# Patient Record
Sex: FEMALE | Race: WHITE | NOT HISPANIC OR LATINO | URBAN - METROPOLITAN AREA
[De-identification: names, ages, dates, MRNs, and addresses within clinical notes are randomized per-mention and may not be internally consistent; named-entity substitution may affect disease eponyms.]

---

## 2019-06-02 ENCOUNTER — EMERGENCY (EMERGENCY)
Facility: HOSPITAL | Age: 29
LOS: 1 days | End: 2019-06-02
Admitting: EMERGENCY MEDICINE
Payer: COMMERCIAL

## 2019-06-02 PROCEDURE — 76830 TRANSVAGINAL US NON-OB: CPT | Mod: 26

## 2019-06-02 PROCEDURE — 76856 US EXAM PELVIC COMPLETE: CPT | Mod: 26

## 2019-06-02 PROCEDURE — 76937 US GUIDE VASCULAR ACCESS: CPT | Mod: 26

## 2019-06-02 PROCEDURE — 99284 EMERGENCY DEPT VISIT MOD MDM: CPT | Mod: 25

## 2019-06-30 ENCOUNTER — EMERGENCY (EMERGENCY)
Facility: HOSPITAL | Age: 29
LOS: 1 days | End: 2019-06-30
Admitting: EMERGENCY MEDICINE
Payer: COMMERCIAL

## 2019-06-30 PROCEDURE — 99283 EMERGENCY DEPT VISIT LOW MDM: CPT

## 2019-09-06 ENCOUNTER — APPOINTMENT (OUTPATIENT)
Dept: CARDIOLOGY | Facility: CLINIC | Age: 29
End: 2019-09-06
Payer: COMMERCIAL

## 2019-09-06 ENCOUNTER — NON-APPOINTMENT (OUTPATIENT)
Age: 29
End: 2019-09-06

## 2019-09-06 VITALS
HEIGHT: 63 IN | HEART RATE: 96 BPM | DIASTOLIC BLOOD PRESSURE: 60 MMHG | SYSTOLIC BLOOD PRESSURE: 110 MMHG | BODY MASS INDEX: 27.29 KG/M2 | WEIGHT: 154 LBS

## 2019-09-06 VITALS
RESPIRATION RATE: 10 BRPM | DIASTOLIC BLOOD PRESSURE: 60 MMHG | WEIGHT: 154 LBS | SYSTOLIC BLOOD PRESSURE: 110 MMHG | BODY MASS INDEX: 27.29 KG/M2 | HEART RATE: 96 BPM | HEIGHT: 63 IN

## 2019-09-06 DIAGNOSIS — Z72.3 LACK OF PHYSICAL EXERCISE: ICD-10-CM

## 2019-09-06 DIAGNOSIS — M54.40 LUMBAGO WITH SCIATICA, UNSPECIFIED SIDE: ICD-10-CM

## 2019-09-06 DIAGNOSIS — Z87.42 PERSONAL HISTORY OF OTHER DISEASES OF THE FEMALE GENITAL TRACT: ICD-10-CM

## 2019-09-06 DIAGNOSIS — Z78.9 OTHER SPECIFIED HEALTH STATUS: ICD-10-CM

## 2019-09-06 DIAGNOSIS — F17.210 NICOTINE DEPENDENCE, CIGARETTES, UNCOMPLICATED: ICD-10-CM

## 2019-09-06 DIAGNOSIS — Z82.49 FAMILY HISTORY OF ISCHEMIC HEART DISEASE AND OTHER DISEASES OF THE CIRCULATORY SYSTEM: ICD-10-CM

## 2019-09-06 DIAGNOSIS — R10.2 PELVIC AND PERINEAL PAIN: ICD-10-CM

## 2019-09-06 DIAGNOSIS — M25.562 PAIN IN LEFT KNEE: ICD-10-CM

## 2019-09-06 DIAGNOSIS — M79.605 PAIN IN LEFT LEG: ICD-10-CM

## 2019-09-06 DIAGNOSIS — Z87.39 PERSONAL HISTORY OF OTHER DISEASES OF THE MUSCULOSKELETAL SYSTEM AND CONNECTIVE TISSUE: ICD-10-CM

## 2019-09-06 PROCEDURE — 93000 ELECTROCARDIOGRAM COMPLETE: CPT

## 2019-09-06 PROCEDURE — 99204 OFFICE O/P NEW MOD 45 MIN: CPT | Mod: 25

## 2019-09-06 NOTE — HISTORY OF PRESENT ILLNESS
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 At first it was thought to be secondary to eye issues. Patient was wearing contacts at that time but switched over to glasses since. Episodes still occur. She describes them at times the room spinning and at times feeling lightheaded like she will pass out. Never had syncope. She gets multiple episodes a day and occurs 4-5 days a week. No triggers that she can identify.  Not getting any better or worse since June. When she does get them she gets palpitations described as racing heart after, however she contributes that to feeling stressed and anxious about her symptoms. It doesn't occur prior to her lightheadedness. She has also noticed swelling in her hands and feet as well since June. Worked up for Lyme which was negative.

## 2019-09-06 NOTE — REVIEW OF SYSTEMS
[Eyeglasses] : currently wearing eyeglasses [Palpitations] : palpitations [see HPI] : see HPI [Anxiety] : anxiety [Negative] : Heme/Lymph [Shortness Of Breath] : no shortness of breath [Chest Pain] : no chest pain [Dyspnea on exertion] : not dyspnea during exertion [Lower Ext Edema] : no extremity edema

## 2019-09-06 NOTE — DISCUSSION/SUMMARY
[FreeTextEntry1] : 1. Lightheadedness/Palpitations: recommend 48 hour Holter Monitor to rule out any arrhythmias. Will order echocardiogram to evaluate for structural heart disease. \par \par Follow up after testing for results.

## 2019-09-06 NOTE — PHYSICAL EXAM
[General Appearance - Well Developed] : well developed [Normal Appearance] : normal appearance [Well Groomed] : well groomed [General Appearance - Well Nourished] : well nourished [General Appearance - In No Acute Distress] : no acute distress [Normal Conjunctiva] : the conjunctiva exhibited no abnormalities [Eyelids - No Xanthelasma] : the eyelids demonstrated no xanthelasmas [No Oral Pallor] : no oral pallor [No Oral Cyanosis] : no oral cyanosis [Heart Rate And Rhythm] : heart rate and rhythm were normal [FreeTextEntry1] : No carotid artery bruits auscultated bilaterally [Murmurs] : no murmurs present [Heart Sounds] : normal S1 and S2 [Edema] : no peripheral edema present [Respiration, Rhythm And Depth] : normal respiratory rhythm and effort [Auscultation Breath Sounds / Voice Sounds] : lungs were clear to auscultation bilaterally [Exaggerated Use Of Accessory Muscles For Inspiration] : no accessory muscle use [Gait - Sufficient For Exercise Testing] : the gait was sufficient for exercise testing [Abnormal Walk] : normal gait [Nail Clubbing] : no clubbing of the fingernails [Cyanosis, Localized] : no localized cyanosis [Skin Turgor] : normal skin turgor [] : no rash [Impaired Insight] : insight and judgment were intact [Affect] : the affect was normal [Memory Recent] : recent memory was not impaired

## 2019-09-12 ENCOUNTER — APPOINTMENT (OUTPATIENT)
Dept: CARDIOLOGY | Facility: CLINIC | Age: 29
End: 2019-09-12
Payer: COMMERCIAL

## 2019-09-12 PROCEDURE — 93306 TTE W/DOPPLER COMPLETE: CPT

## 2019-09-12 PROCEDURE — 0296T: CPT

## 2019-09-27 ENCOUNTER — APPOINTMENT (OUTPATIENT)
Dept: CARDIOLOGY | Facility: CLINIC | Age: 29
End: 2019-09-27
Payer: COMMERCIAL

## 2019-09-27 VITALS
BODY MASS INDEX: 26.58 KG/M2 | HEIGHT: 63 IN | DIASTOLIC BLOOD PRESSURE: 72 MMHG | HEART RATE: 80 BPM | OXYGEN SATURATION: 97 % | WEIGHT: 150 LBS | SYSTOLIC BLOOD PRESSURE: 112 MMHG

## 2019-09-27 PROCEDURE — 0298T: CPT

## 2019-09-27 PROCEDURE — 99214 OFFICE O/P EST MOD 30 MIN: CPT

## 2019-09-27 NOTE — PHYSICAL EXAM
[General Appearance - Well Developed] : well developed [Normal Appearance] : normal appearance [Well Groomed] : well groomed [General Appearance - Well Nourished] : well nourished [General Appearance - In No Acute Distress] : no acute distress [Normal Conjunctiva] : the conjunctiva exhibited no abnormalities [Eyelids - No Xanthelasma] : the eyelids demonstrated no xanthelasmas [No Oral Pallor] : no oral pallor [No Oral Cyanosis] : no oral cyanosis [FreeTextEntry1] : No carotid artery bruits auscultated bilaterally [Respiration, Rhythm And Depth] : normal respiratory rhythm and effort [Exaggerated Use Of Accessory Muscles For Inspiration] : no accessory muscle use [Auscultation Breath Sounds / Voice Sounds] : lungs were clear to auscultation bilaterally [Heart Rate And Rhythm] : heart rate and rhythm were normal [Heart Sounds] : normal S1 and S2 [Murmurs] : no murmurs present [Edema] : no peripheral edema present [Abnormal Walk] : normal gait [Gait - Sufficient For Exercise Testing] : the gait was sufficient for exercise testing [Nail Clubbing] : no clubbing of the fingernails [Cyanosis, Localized] : no localized cyanosis [Skin Turgor] : normal skin turgor [] : no rash [Impaired Insight] : insight and judgment were intact [Affect] : the affect was normal [Memory Recent] : recent memory was not impaired

## 2019-09-27 NOTE — REVIEW OF SYSTEMS
[Eyeglasses] : currently wearing eyeglasses [Shortness Of Breath] : no shortness of breath [Dyspnea on exertion] : not dyspnea during exertion [Chest Pain] : no chest pain [Lower Ext Edema] : no extremity edema [Palpitations] : palpitations [see HPI] : see HPI [Anxiety] : anxiety [Negative] : Heme/Lymph

## 2019-09-27 NOTE — HISTORY OF PRESENT ILLNESS
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 At first it was thought to be secondary to eye issues. Patient was wearing contacts at that time but switched over to glasses since. Episodes still occur. She describes them at times the room spinning and at times feeling lightheaded like she will pass out. Never had syncope. She gets multiple episodes a day and occurs 4-5 days a week. No triggers that she can identify.  Not getting any better or worse since June. When she does get them she gets palpitations described as racing heart after, however she contributes that to feeling stressed and anxious about her symptoms. It doesn't occur prior to her lightheadedness. She has also noticed swelling in her hands and feet as well since June. Worked up for Lyme which was negative. \par \par Current Health Status:\par Patient still with intermittent dizziness. Palpitations improved. No syncope. Patient going to speak with psychologist because of her anxiety.

## 2020-07-11 ENCOUNTER — EMERGENCY (EMERGENCY)
Facility: HOSPITAL | Age: 30
LOS: 1 days | End: 2020-07-11
Admitting: EMERGENCY MEDICINE
Payer: COMMERCIAL

## 2020-07-11 PROCEDURE — 99284 EMERGENCY DEPT VISIT MOD MDM: CPT

## 2020-10-22 ENCOUNTER — NON-APPOINTMENT (OUTPATIENT)
Age: 30
End: 2020-10-22

## 2020-10-22 ENCOUNTER — APPOINTMENT (OUTPATIENT)
Dept: CARDIOLOGY | Facility: CLINIC | Age: 30
End: 2020-10-22
Payer: COMMERCIAL

## 2020-10-22 VITALS
WEIGHT: 151 LBS | HEART RATE: 102 BPM | DIASTOLIC BLOOD PRESSURE: 62 MMHG | SYSTOLIC BLOOD PRESSURE: 110 MMHG | HEIGHT: 62 IN | OXYGEN SATURATION: 99 % | TEMPERATURE: 97.5 F | BODY MASS INDEX: 27.79 KG/M2

## 2020-10-22 PROCEDURE — 99214 OFFICE O/P EST MOD 30 MIN: CPT | Mod: 25

## 2020-10-22 PROCEDURE — 93000 ELECTROCARDIOGRAM COMPLETE: CPT

## 2020-10-22 PROCEDURE — 99072 ADDL SUPL MATRL&STAF TM PHE: CPT

## 2020-10-22 RX ORDER — OMEGA-3/DHA/EPA/FISH OIL 300-1000MG
400 CAPSULE ORAL DAILY
Refills: 0 | Status: ACTIVE | COMMUNITY

## 2020-10-22 NOTE — DISCUSSION/SUMMARY
[FreeTextEntry1] : 1. Lightheadedness/Palpitations: patient wore Zio Patch for 48 hours last year. Had NSR with intermittent episode of sinus tachycardia vs. atrial tachycardia. Opted to avoid medications at this point in time. TSH was within normal limits. Structurally normal heart on echocardiogram. Now that she is pregnant I am going to repeat labs and echocardiogram. Asked patient to maintain hydration, avoid situations where she has to strain, and avoid prolong standing. \par \par Follow up in 6 months. \par \par

## 2020-10-22 NOTE — PHYSICAL EXAM
[General Appearance - Well Developed] : well developed [Normal Appearance] : normal appearance [Well Groomed] : well groomed [General Appearance - Well Nourished] : well nourished [General Appearance - In No Acute Distress] : no acute distress [Normal Conjunctiva] : the conjunctiva exhibited no abnormalities [Eyelids - No Xanthelasma] : the eyelids demonstrated no xanthelasmas [No Oral Pallor] : no oral pallor [No Oral Cyanosis] : no oral cyanosis [Respiration, Rhythm And Depth] : normal respiratory rhythm and effort [Exaggerated Use Of Accessory Muscles For Inspiration] : no accessory muscle use [Auscultation Breath Sounds / Voice Sounds] : lungs were clear to auscultation bilaterally [Heart Rate And Rhythm] : heart rate and rhythm were normal [Heart Sounds] : normal S1 and S2 [Murmurs] : no murmurs present [Edema] : no peripheral edema present [Abnormal Walk] : normal gait [Gait - Sufficient For Exercise Testing] : the gait was sufficient for exercise testing [Nail Clubbing] : no clubbing of the fingernails [Cyanosis, Localized] : no localized cyanosis [Skin Turgor] : normal skin turgor [] : no rash [Impaired Insight] : insight and judgment were intact [Affect] : the affect was normal [Memory Recent] : recent memory was not impaired [FreeTextEntry1] : No carotid artery bruits auscultated bilaterally

## 2020-10-22 NOTE — HISTORY OF PRESENT ILLNESS
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 At first it was thought to be secondary to eye issues. Patient was wearing contacts at that time but switched over to glasses since. Episodes still occur. She describes them at times the room spinning and at times feeling lightheaded like she will pass out. Never had syncope. She gets multiple episodes a day and occurs 4-5 days a week. No triggers that she can identify.  Not getting any better or worse since June. When she does get them she gets palpitations described as racing heart after, however she contributes that to feeling stressed and anxious about her symptoms. It doesn't occur prior to her lightheadedness. She has also noticed swelling in her hands and feet as well since June. Worked up for Lyme which was negative. \par \par Current Health Status:\par Since seeing me last patient had to terminate a pregnancy in March 2020 because fetus was non-viable. She is currently 21 weeks pregnant. Over the weekend, while urinating, she developed lightheadedness, flushing and palpitations. She did not have syncope. It resolved spontaneously within 10 minutes. Patient states it occurred again while driving to work. Since she has been feeling tired throughout the day. There is no chest pain or SOB. Up until this point her pregnancy has been uneventful.

## 2020-10-22 NOTE — REVIEW OF SYSTEMS
[Eyeglasses] : currently wearing eyeglasses [Palpitations] : palpitations [see HPI] : see HPI [Anxiety] : anxiety [Negative] : Heme/Lymph [Shortness Of Breath] : no shortness of breath [Dyspnea on exertion] : not dyspnea during exertion [Chest Pain] : no chest pain [Lower Ext Edema] : no extremity edema

## 2020-11-03 ENCOUNTER — APPOINTMENT (OUTPATIENT)
Dept: CARDIOLOGY | Facility: CLINIC | Age: 30
End: 2020-11-03
Payer: COMMERCIAL

## 2020-11-03 PROCEDURE — 99072 ADDL SUPL MATRL&STAF TM PHE: CPT

## 2020-11-03 PROCEDURE — 93306 TTE W/DOPPLER COMPLETE: CPT

## 2021-04-13 ENCOUNTER — APPOINTMENT (OUTPATIENT)
Dept: CARDIOLOGY | Facility: CLINIC | Age: 31
End: 2021-04-13

## 2021-09-07 ENCOUNTER — EMERGENCY (EMERGENCY)
Facility: HOSPITAL | Age: 31
LOS: 1 days | End: 2021-09-07
Admitting: EMERGENCY MEDICINE
Payer: COMMERCIAL

## 2021-09-07 PROCEDURE — L9991: CPT

## 2021-09-27 ENCOUNTER — RESULT REVIEW (OUTPATIENT)
Age: 31
End: 2021-09-27

## 2021-09-27 ENCOUNTER — OUTPATIENT (OUTPATIENT)
Dept: OUTPATIENT SERVICES | Facility: HOSPITAL | Age: 31
LOS: 1 days | End: 2021-09-27
Payer: MEDICAID

## 2021-09-27 PROCEDURE — 74176 CT ABD & PELVIS W/O CONTRAST: CPT | Mod: 26

## 2021-11-15 ENCOUNTER — APPOINTMENT (OUTPATIENT)
Dept: DISASTER EMERGENCY | Facility: CLINIC | Age: 31
End: 2021-11-15

## 2021-11-16 LAB — SARS-COV-2 N GENE NPH QL NAA+PROBE: NOT DETECTED

## 2021-11-18 ENCOUNTER — OUTPATIENT (OUTPATIENT)
Dept: OUTPATIENT SERVICES | Facility: HOSPITAL | Age: 31
LOS: 1 days | End: 2021-11-18

## 2022-09-14 ENCOUNTER — NON-APPOINTMENT (OUTPATIENT)
Age: 32
End: 2022-09-14

## 2022-11-23 ENCOUNTER — APPOINTMENT (OUTPATIENT)
Dept: CARDIOLOGY | Facility: CLINIC | Age: 32
End: 2022-11-23

## 2022-11-30 ENCOUNTER — NON-APPOINTMENT (OUTPATIENT)
Age: 32
End: 2022-11-30

## 2022-11-30 ENCOUNTER — APPOINTMENT (OUTPATIENT)
Dept: CARDIOLOGY | Facility: CLINIC | Age: 32
End: 2022-11-30

## 2022-11-30 VITALS
BODY MASS INDEX: 27.79 KG/M2 | HEIGHT: 62 IN | DIASTOLIC BLOOD PRESSURE: 64 MMHG | WEIGHT: 151 LBS | HEART RATE: 88 BPM | SYSTOLIC BLOOD PRESSURE: 114 MMHG | OXYGEN SATURATION: 98 %

## 2022-11-30 PROCEDURE — 93242 EXT ECG>48HR<7D RECORDING: CPT | Mod: 59

## 2022-11-30 PROCEDURE — 99214 OFFICE O/P EST MOD 30 MIN: CPT | Mod: 25

## 2022-11-30 PROCEDURE — 93000 ELECTROCARDIOGRAM COMPLETE: CPT

## 2022-11-30 NOTE — CARDIOLOGY SUMMARY
[Normal] : normal [___] : [unfilled] [LVEF ___%] : LVEF [unfilled]% [de-identified] : November 30, 2022 normal sinus

## 2022-11-30 NOTE — ASSESSMENT
[FreeTextEntry1] : Reviewed on November 30, 2020.\par EKG as noted above\par Labs from October 17, 2022 sodium 139 potassium 4.2 creatinine 0.49.   triglycerides 78 HDL 51 total cholesterol 193.  TSH was 1.07 CBC was normal

## 2022-11-30 NOTE — DISCUSSION/SUMMARY
[FreeTextEntry1] : 32-year-old female with above medical history active medical problems as noted below\par 1.  Intermittent palpitations now associated near syncope.  Shortness of breath and chest discomfort.  With exertion.\par Recommendation includes\par Hydration\par Echocardiogram\par Exercise treadmill stress test\par Event monitor\par Based on above we can discuss further whether we should consider beta-blockers or not.\par Continue to avoid stimulants like caffeine and alcohol intake as much as possible.\par Increasing regular exercise\par Stress management\par #2 elevated LDL cholesterol.  Dietary restriction and follow-up.  No indication for statin therapy at present.\par \par Counseling regarding low saturated fat, salt and carbohydrate intake was reviewed. Active lifestyle and regular. Exercise along with weight management is advised.\par All the above were at length reviewed. Answered all the questions. Thank you very much for this kind referral. Please do not hesitate to give me a call for any question.\par Part of this transcription was done with voice recognition software and phonetically similar errors are common. I apologize for that. Please do not hesitate to call for any questions due to above.\par \par Sincerely,\par Latosha Friend MD,FACC,MARQUIS\par \par

## 2022-11-30 NOTE — REASON FOR VISIT
[Symptom and Test Evaluation] : symptom and test evaluation [FreeTextEntry1] : 32-year-old white female is referred to me for consultation in presence of\par Intermittent palpitations.  Lasting for few seconds though recently felt very dizzy lightheaded almost felt like passing out.  She had her apple watch.  Which noted his heart rate at 140 to 150 bpm.\par She also has noticed that she is having increasing shortness of breath\par And intermittent substernal sharp chest pain when she overdoes activity.\par She had 2 pregnancies.  She had symptoms of palpitation intermittent dizziness and event monitor showing intermittent sinus tachycardia and rate related bundle branch block.\par At the time echocardiogram has shown preserved EF.\par \par She is referred to me again because of about new symptoms.\par She states she has no increased salt intake.  Good dietary restrictions.  Does not do regular exercise as she has 2 toddlers and has been very active.\par She denies any history of smoking.  No significant alcohol intake.

## 2022-11-30 NOTE — PHYSICAL EXAM
[Well Developed] : well developed [Well Nourished] : well nourished [No Acute Distress] : no acute distress [Normal Venous Pressure] : normal venous pressure [No Carotid Bruit] : no carotid bruit [Normal S1, S2] : normal S1, S2 [No Murmur] : no murmur [No Rub] : no rub [No Gallop] : no gallop [Clear Lung Fields] : clear lung fields [Good Air Entry] : good air entry [No Respiratory Distress] : no respiratory distress  [Normal Gait] : normal gait [No Edema] : no edema [No Cyanosis] : no cyanosis [No Clubbing] : no clubbing [Normal Radial B/L] : normal radial B/L [Normal DP B/L] : normal DP B/L [Moves all extremities] : moves all extremities [Normal Speech] : normal speech [Alert and Oriented] : alert and oriented

## 2022-12-09 ENCOUNTER — APPOINTMENT (OUTPATIENT)
Dept: CARDIOLOGY | Facility: CLINIC | Age: 32
End: 2022-12-09

## 2022-12-09 PROCEDURE — 93244 EXT ECG>48HR<7D REV&INTERPJ: CPT

## 2022-12-16 ENCOUNTER — APPOINTMENT (OUTPATIENT)
Dept: CARDIOLOGY | Facility: CLINIC | Age: 32
End: 2022-12-16

## 2022-12-16 PROCEDURE — 93306 TTE W/DOPPLER COMPLETE: CPT

## 2022-12-20 ENCOUNTER — APPOINTMENT (OUTPATIENT)
Dept: CARDIOLOGY | Facility: CLINIC | Age: 32
End: 2022-12-20

## 2022-12-20 PROCEDURE — 93015 CV STRESS TEST SUPVJ I&R: CPT

## 2022-12-22 ENCOUNTER — APPOINTMENT (OUTPATIENT)
Dept: CARDIOLOGY | Facility: CLINIC | Age: 32
End: 2022-12-22

## 2022-12-22 PROCEDURE — 93351 STRESS TTE COMPLETE: CPT

## 2022-12-22 PROCEDURE — 93320 DOPPLER ECHO COMPLETE: CPT

## 2023-01-04 ENCOUNTER — APPOINTMENT (OUTPATIENT)
Dept: CARDIOLOGY | Facility: CLINIC | Age: 33
End: 2023-01-04

## 2023-01-05 ENCOUNTER — APPOINTMENT (OUTPATIENT)
Dept: CARDIOLOGY | Facility: CLINIC | Age: 33
End: 2023-01-05

## 2023-01-08 NOTE — PHYSICAL EXAM
[Well Developed] : well developed [Well Nourished] : well nourished [No Acute Distress] : no acute distress [Normal Venous Pressure] : normal venous pressure [No Carotid Bruit] : no carotid bruit [Normal S1, S2] : normal S1, S2 [No Rub] : no rub [No Murmur] : no murmur [No Gallop] : no gallop [Good Air Entry] : good air entry [Clear Lung Fields] : clear lung fields [No Respiratory Distress] : no respiratory distress  [Normal Gait] : normal gait [No Edema] : no edema [No Cyanosis] : no cyanosis [No Clubbing] : no clubbing [Normal Radial B/L] : normal radial B/L [Moves all extremities] : moves all extremities [Normal Speech] : normal speech [Alert and Oriented] : alert and oriented

## 2023-01-09 ENCOUNTER — APPOINTMENT (OUTPATIENT)
Dept: CARDIOLOGY | Facility: CLINIC | Age: 33
End: 2023-01-09
Payer: MEDICAID

## 2023-01-09 VITALS
BODY MASS INDEX: 26.22 KG/M2 | HEIGHT: 63 IN | WEIGHT: 148 LBS | SYSTOLIC BLOOD PRESSURE: 128 MMHG | HEART RATE: 98 BPM | OXYGEN SATURATION: 95 % | DIASTOLIC BLOOD PRESSURE: 76 MMHG

## 2023-01-09 PROCEDURE — 99214 OFFICE O/P EST MOD 30 MIN: CPT

## 2023-01-09 RX ORDER — CHLORHEXIDINE GLUCONATE 4 %
400 LIQUID (ML) TOPICAL DAILY
Refills: 0 | Status: ACTIVE | COMMUNITY
Start: 2023-01-09

## 2023-01-09 NOTE — REASON FOR VISIT
[Symptom and Test Evaluation] : symptom and test evaluation [FreeTextEntry1] : 32-year-old comes in for follow-up consultation to review her cardiovascular test and recent emergency room visit with EKGs.  She was seen recently when referred to me for consultation in presence of\par Intermittent palpitations.  Lasting for few seconds though recently felt very dizzy lightheaded almost felt like passing out.  She had her apple watch.  Which noted his heart rate at 140 to 150 bpm.\par She also has noticed that she is having increasing shortness of breath\par And intermittent substernal sharp chest pain when she overdoes activity.\par She had 2 pregnancies.  She had symptoms of palpitation intermittent dizziness and event monitor showing intermittent sinus tachycardia and rate related bundle branch block.\par At the time echocardiogram has shown preserved EF.\par \par She is referred to me again because of about new symptoms.\par She states she has no increased salt intake.  Good dietary restrictions.  Does not do regular exercise as she has 2 toddlers and has been very active.\par She denies any history of smoking.  No significant alcohol intake.

## 2023-01-09 NOTE — DISCUSSION/SUMMARY
[FreeTextEntry1] : 32-year-old female with above medical history active medical problems as noted below\par 1.  Intermittent palpitations now associated near syncope.  Shortness of breath and chest discomfort.  With exertion.  Intermittent left bundle branch block.  Nonischemic stress test.  Preserved EF.\par Recommendation includes\par Considering above symptoms, intermittent bundle branch block, dyspnea, intermittent chest pain and arrhythmias recommended her to have cardiac MRI to rule out any significant myocarditis/myopathy to account for her above clinical scenario.  Risk benefits alternatives of MRI with contrast reviewed\par Continue to avoid caffeine and alcohol intake\par Continue hydration\par Based on above we can discuss further whether we should consider beta-blockers or not.\par Increasing regular exercise\par Stress management\par #2 elevated LDL cholesterol.  Dietary restriction and follow-up.  No indication for statin therapy at present.\par \par Counseling regarding low saturated fat, salt and carbohydrate intake was reviewed. Active lifestyle and regular. Exercise along with weight management is advised.\par All the above were at length reviewed. Answered all the questions. Thank you very much for this kind referral. Please do not hesitate to give me a call for any question.\par Part of this transcription was done with voice recognition software and phonetically similar errors are common. I apologize for that. Please do not hesitate to call for any questions due to above.\par \par She will contact me after the MRI so that appropriate review can be done.  Otherwise if remains stable and negative MRI follow-up in 1 year or as needed\par \par Sincerely,\par Latosha Friend MD,FACC,MARQUIS\par \par

## 2023-01-09 NOTE — CARDIOLOGY SUMMARY
[Normal] : normal [___] : [unfilled] [LVEF ___%] : LVEF [unfilled]% [de-identified] : November 30, 2022 normal sinus\par EKG in the emergency room December 31, 2022 normal sinus rhythm left bundle branch block [de-identified] : Event monitor.  Showed intermittent left bundle branch block [de-identified] : Stress Echocardiogram December 22, 2022.  Nonischemic in presence of left bundle branch [de-identified] : December 2022.  LVEF 55 to 60% normal LV dimensions

## 2023-01-09 NOTE — ASSESSMENT
[FreeTextEntry1] : Reviewed on November 30, 2020.\par EKG as noted above\par Labs from October 17, 2022 sodium 139 potassium 4.2 creatinine 0.49.   triglycerides 78 HDL 51 total cholesterol 193.  TSH was 1.07 CBC was normal\par \par Reviewed on January 9, 2022.\par EKGs from the emergency room, echocardiogram, exercise echocardiogram were all reviewed.  Event monitor reviewed.

## 2023-01-09 NOTE — REVIEW OF SYSTEMS
[Negative] : Heme/Lymph [SOB] : no shortness of breath [Dyspnea on exertion] : dyspnea during exertion [Chest Discomfort] : chest discomfort [Palpitations] : palpitations [FreeTextEntry5] : As per HPI

## 2023-01-30 ENCOUNTER — NON-APPOINTMENT (OUTPATIENT)
Age: 33
End: 2023-01-30

## 2023-02-03 ENCOUNTER — NON-APPOINTMENT (OUTPATIENT)
Age: 33
End: 2023-02-03

## 2023-03-16 ENCOUNTER — APPOINTMENT (OUTPATIENT)
Dept: CARDIOLOGY | Facility: CLINIC | Age: 33
End: 2023-03-16
Payer: MEDICAID

## 2023-03-16 VITALS
TEMPERATURE: 98 F | SYSTOLIC BLOOD PRESSURE: 120 MMHG | BODY MASS INDEX: 26.58 KG/M2 | HEART RATE: 102 BPM | HEIGHT: 63 IN | DIASTOLIC BLOOD PRESSURE: 68 MMHG | WEIGHT: 150 LBS | OXYGEN SATURATION: 98 %

## 2023-03-16 DIAGNOSIS — Z01.818 ENCOUNTER FOR OTHER PREPROCEDURAL EXAMINATION: ICD-10-CM

## 2023-03-16 DIAGNOSIS — R06.02 SHORTNESS OF BREATH: ICD-10-CM

## 2023-03-16 PROCEDURE — 99214 OFFICE O/P EST MOD 30 MIN: CPT

## 2023-03-16 NOTE — CARDIOLOGY SUMMARY
[de-identified] : November 30, 2022 normal sinus\par EKG in the emergency room December 31, 2022 normal sinus rhythm left bundle branch block [de-identified] : Event monitor.  Showed intermittent left bundle branch block [de-identified] : Stress Echocardiogram December 22, 2022.  Nonischemic in presence of left bundle branch [de-identified] : December 2022.  LVEF 55 to 60% normal LV dimensions [Normal] : normal [___] : [unfilled] [LVEF ___%] : LVEF [unfilled]%

## 2023-03-16 NOTE — REVIEW OF SYSTEMS
[SOB] : no shortness of breath [Dyspnea on exertion] : dyspnea during exertion [Chest Discomfort] : chest discomfort [Palpitations] : palpitations [Negative] : Heme/Lymph [FreeTextEntry5] : As per HPI

## 2023-03-16 NOTE — DISCUSSION/SUMMARY
[FreeTextEntry1] : 32-year-old female with above medical history active medical problems as noted below\par \par 1.  Intermittent palpitations now associated near syncope.  Intermittent left bundle branch block.  Nonischemic stress test.  Preserved EF.  Consideration of POTS syndrome.\par \par Recommendation includes\par Cardiac MRI was discussed with the patient\par Continue to avoid caffeine and alcohol intake\par Continue hydration and liberal salt intake, lower extremity compressions, gradual change of postures and if possible, low-dose beta-blockers after her procedure ( DNC) is finished.\par Stress management\par \par #2 elevated LDL cholesterol.  Dietary restriction and follow-up.  No indication for statin therapy at present.\par \par Counseling regarding low saturated fat, salt and carbohydrate intake was reviewed. Active lifestyle and regular. \par \par **PREOP:\par Patient should be low risk for major cardiovascular events from anticipated DNC with anesthesia.\par Her cardiac MRI, EKG, stress echocardiogram were reviewed.  New medication was started today\par \par For the long-term: The patient has tendency for palpitations, tachycardia and POTS should be considered. Continue hydration and liberal salt intake, lower extremity compressions, gradual change of postures and if possible, low-dose beta-blockers -she wants to think regarding this and will contact us\par \par \par Thank you for this referral and allowing me to participate in the care of this patient.  If I can be of any further help or  if you have any questions, please do not hesitate to contact me\par \par \par Sincerely,\par \par Ellis Tam MD, FACC, MARQUIS\par \par

## 2023-03-16 NOTE — REASON FOR VISIT
[FreeTextEntry1] : 32-year-old female:\par \par History of intermittent palpitations.  Lasting for few seconds though recently felt very dizzy lightheaded almost felt like passing out.  She had her apple watch.  Which noted his heart rate at 140 to 150 bpm.\par She has noticed fatigue.  No longer has any chest pains.  Diagnosis of POTS was considered because of postural high heart rate with dizziness\par \par She had 2 pregnancies.  She had symptoms of palpitation intermittent dizziness and event monitor showing intermittent sinus tachycardia and rate related bundle branch block on ZIO recording November 2022. At the time echocardiogram has shown preserved EF.\par \par Does not do regular exercise as she has 2 toddlers and has been very active.  No exertional chest pain or shortness of breath.  No recent syncope.  He does have palpitations as noted above.\par She denies any history of smoking.  No significant alcohol intake.\par \par She had cardiac MRI in January 2023 which was normal with and without contrast.  She had stress echocardiogram in December 2022 which was negative for ischemia.\par \par She is scheduled to have DNC

## 2023-03-24 ENCOUNTER — NON-APPOINTMENT (OUTPATIENT)
Age: 33
End: 2023-03-24

## 2023-04-27 ENCOUNTER — NON-APPOINTMENT (OUTPATIENT)
Age: 33
End: 2023-04-27

## 2023-05-28 ENCOUNTER — NON-APPOINTMENT (OUTPATIENT)
Age: 33
End: 2023-05-28

## 2023-07-07 ENCOUNTER — APPOINTMENT (OUTPATIENT)
Dept: CARDIOLOGY | Facility: CLINIC | Age: 33
End: 2023-07-07
Payer: MEDICAID

## 2023-07-07 VITALS
HEART RATE: 87 BPM | HEIGHT: 63 IN | DIASTOLIC BLOOD PRESSURE: 68 MMHG | BODY MASS INDEX: 26.58 KG/M2 | WEIGHT: 150 LBS | OXYGEN SATURATION: 94 % | SYSTOLIC BLOOD PRESSURE: 100 MMHG

## 2023-07-07 DIAGNOSIS — R07.89 OTHER CHEST PAIN: ICD-10-CM

## 2023-07-07 PROCEDURE — 99214 OFFICE O/P EST MOD 30 MIN: CPT

## 2023-07-07 NOTE — HISTORY OF PRESENT ILLNESS
[FreeTextEntry1] : Presenting here today with headaches.  Her dad passed away few days ago.  She is under a lot of stress.  No chest pains or any significant palpitations.  No syncope.

## 2023-07-07 NOTE — CARDIOLOGY SUMMARY
[Normal] : normal [___] : [unfilled] [LVEF ___%] : LVEF [unfilled]% [de-identified] : November 30, 2022 normal sinus\par EKG in the emergency room December 31, 2022 normal sinus rhythm left bundle branch block [de-identified] : Event monitor.  Showed intermittent left bundle branch block [de-identified] : Stress Echocardiogram December 22, 2022.  Nonischemic in presence of left bundle branch [de-identified] : December 2022.  LVEF 55 to 60% normal LV dimensions

## 2023-07-07 NOTE — DISCUSSION/SUMMARY
[FreeTextEntry1] : 32-year-old female with above medical history active medical problems as noted below\par \par 1.  Intermittent palpitations now associated near syncope.  Intermittent left bundle branch block.  Nonischemic stress test.  Preserved EF.  Consideration of POTS syndrome.\par \par Recommendation includes\par Cardiac MRI was discussed with the patient\par Continue to avoid caffeine and alcohol intake\par Continue hydration and liberal salt intake, lower extremity compressions, gradual change of postures and if possible, low-dose beta-blockers after her procedure ( DNC) is finished.\par Stress management\par \par #2 elevated LDL cholesterol.  Dietary restriction and follow-up.  No indication for statin therapy at present.\par \par Counseling regarding low saturated fat, salt and carbohydrate intake was reviewed. Active lifestyle and regular. \par \par **PREOP:\par Patient should be low risk for major cardiovascular events from anticipated DNC with anesthesia.\par Her cardiac MRI, EKG, stress echocardiogram were reviewed.  New medication was started today\par \par For the long-term: The patient has tendency for palpitations, tachycardia and POTS should be considered. Continue hydration and liberal salt intake, lower extremity compressions, gradual change of postures and if possible, low-dose beta-blockers -she wants to think regarding this and will contact us\par \par \par We will order Zio patch for 1 week to reassess the heart rhythm.  Increased hydration discussed with the patient.  She has intermittent headaches.  I advised that she will follow-up with neurologist as soon as possible.  Cardiac follow-up after Zio patch.\par

## 2023-07-07 NOTE — REVIEW OF SYSTEMS
[Dyspnea on exertion] : dyspnea during exertion [Chest Discomfort] : chest discomfort [Palpitations] : palpitations [Negative] : Heme/Lymph [SOB] : no shortness of breath [FreeTextEntry5] : As per HPI

## 2023-07-11 ENCOUNTER — APPOINTMENT (OUTPATIENT)
Dept: CARDIOLOGY | Facility: CLINIC | Age: 33
End: 2023-07-11

## 2023-07-15 ENCOUNTER — OFFICE (OUTPATIENT)
Dept: URBAN - METROPOLITAN AREA CLINIC 38 | Facility: CLINIC | Age: 33
Setting detail: OPHTHALMOLOGY
End: 2023-07-15
Payer: COMMERCIAL

## 2023-07-15 DIAGNOSIS — H18.823: ICD-10-CM

## 2023-07-15 DIAGNOSIS — G43.109: ICD-10-CM

## 2023-07-15 DIAGNOSIS — H10.45: ICD-10-CM

## 2023-07-15 DIAGNOSIS — H43.391: ICD-10-CM

## 2023-07-15 PROCEDURE — 92014 COMPRE OPH EXAM EST PT 1/>: CPT | Performed by: OPTOMETRIST

## 2023-07-15 ASSESSMENT — TONOMETRY
OS_IOP_MMHG: 14
OD_IOP_MMHG: 15

## 2023-07-15 ASSESSMENT — REFRACTION_AUTOREFRACTION
OD_AXIS: 006
OS_AXIS: 161
OD_CYLINDER: -2.75
OD_SPHERE: -4.50
OS_SPHERE: -4.50
OS_CYLINDER: -3.25

## 2023-07-15 ASSESSMENT — REFRACTION_MANIFEST
OS_VA1: 20/20
OU_VA: 20/20
OD_SPHERE: -4.00
OD_VA1: 20/20
OS_SPHERE: -3.75
OD_CYLINDER: -2.00
OD_AXIS: 10
OS_AXIS: 155
OS_CYLINDER: -2.00

## 2023-07-15 ASSESSMENT — REFRACTION_CURRENTRX
OS_SPHERE: -3.75
OD_SPHERE: -4.00
OS_OVR_VA: 20/
OD_OVR_VA: 20/
OD_CYLINDER: -2.00
OD_VPRISM_DIRECTION: SV
OD_AXIS: 011
OS_AXIS: 154
OS_VPRISM_DIRECTION: SV
OS_CYLINDER: -2.00

## 2023-07-15 ASSESSMENT — SPHEQUIV_DERIVED
OD_SPHEQUIV: -5
OD_SPHEQUIV: -5.875
OS_SPHEQUIV: -4.75
OS_SPHEQUIV: -6.125

## 2023-07-15 ASSESSMENT — CONFRONTATIONAL VISUAL FIELD TEST (CVF)
OS_FINDINGS: FULL
OD_FINDINGS: FULL

## 2023-07-15 ASSESSMENT — AXIALLENGTH_DERIVED
OD_AL: 25.2558
OS_AL: 24.8649
OS_AL: 25.4746
OD_AL: 24.8711

## 2023-07-15 ASSESSMENT — KERATOMETRY
OS_K2POWER_DIOPTERS: 46.50
OD_K1POWER_DIOPTERS: 44.25
OD_AXISANGLE_DEGREES: 095
OD_K2POWER_DIOPTERS: 46.75
OS_AXISANGLE_DEGREES: 073
OS_K1POWER_DIOPTERS: 44.00

## 2023-07-15 ASSESSMENT — VISUAL ACUITY
OD_BCVA: 20/20
OS_BCVA: 20/20-1

## 2023-07-25 ENCOUNTER — APPOINTMENT (OUTPATIENT)
Dept: CARDIOLOGY | Facility: CLINIC | Age: 33
End: 2023-07-25
Payer: MEDICAID

## 2023-07-25 VITALS
DIASTOLIC BLOOD PRESSURE: 60 MMHG | OXYGEN SATURATION: 96 % | WEIGHT: 152 LBS | BODY MASS INDEX: 26.93 KG/M2 | HEIGHT: 63 IN | SYSTOLIC BLOOD PRESSURE: 110 MMHG | HEART RATE: 86 BPM

## 2023-07-25 PROCEDURE — 99214 OFFICE O/P EST MOD 30 MIN: CPT

## 2023-07-25 NOTE — ASSESSMENT
[FreeTextEntry1] : Reviewed on November 30, 2020.\par EKG as noted above\par Labs from October 17, 2022 sodium 139 potassium 4.2 creatinine 0.49.   triglycerides 78 HDL 51 total cholesterol 193.  TSH was 1.07 CBC was normal\par \par Reviewed on January 9, 2022.\par EKGs from the emergency room, echocardiogram, exercise echocardiogram were all reviewed.  Event monitor reviewed.\par \par July 25, 2023.\par Event monitor as noted above reviewed

## 2023-07-25 NOTE — REVIEW OF SYSTEMS
[Dyspnea on exertion] : dyspnea during exertion [Chest Discomfort] : chest discomfort [Palpitations] : palpitations [Negative] : Heme/Lymph [SOB] : no shortness of breath [FreeTextEntry5] : As per HPI [de-identified] : As noted above

## 2023-07-25 NOTE — REASON FOR VISIT
[Symptom and Test Evaluation] : symptom and test evaluation [FreeTextEntry1] : \par 33-year-old female comes in with multitude of symptoms which includes intermittent palpitations, postural dizziness, abdominal and gastrointestinal symptoms, ophthalmic symptoms.  She has seen multiple specialist.  No clear etiology has been found.  I have reviewed her event monitor.\par She is referred to me again because of about new symptoms.\par She states she has no increased salt intake.  Good dietary restrictions.  Does not do regular exercise as she has 2 toddlers and has been very active.\par She denies any history of smoking.  No significant alcohol intake.

## 2023-07-25 NOTE — CARDIOLOGY SUMMARY
[Normal] : normal [___] : [unfilled] [LVEF ___%] : LVEF [unfilled]% [de-identified] : November 30, 2022 normal sinus\par EKG in the emergency room December 31, 2022 normal sinus rhythm left bundle branch block [de-identified] : Event monitor.  Showed intermittent left bundle branch block\par Event monitor.  7 days.  July 2022.  Range .  In normal sinus rhythm.  PACs.  PVCs. [de-identified] : Stress Echocardiogram December 22, 2022.  Nonischemic in presence of left bundle branch [de-identified] : December 2022.  LVEF 55 to 60% normal LV dimensions [de-identified] : cardiac MRI in January 2023 which was normal with and without contrast.

## 2023-07-25 NOTE — PHYSICAL EXAM
[Well Developed] : well developed [Well Nourished] : well nourished [No Acute Distress] : no acute distress [Normal Venous Pressure] : normal venous pressure [No Carotid Bruit] : no carotid bruit [Normal S1, S2] : normal S1, S2 [No Murmur] : no murmur [No Rub] : no rub [No Gallop] : no gallop [Clear Lung Fields] : clear lung fields [Good Air Entry] : good air entry [No Respiratory Distress] : no respiratory distress  [Normal Gait] : normal gait [No Edema] : no edema [No Cyanosis] : no cyanosis [No Clubbing] : no clubbing [Normal Radial B/L] : normal radial B/L [Moves all extremities] : moves all extremities [Normal Speech] : normal speech [Alert and Oriented] : alert and oriented [de-identified] : No postural shifts in blood pressure

## 2023-08-30 ENCOUNTER — APPOINTMENT (OUTPATIENT)
Dept: CARDIOLOGY | Facility: CLINIC | Age: 33
End: 2023-08-30

## 2023-09-17 ENCOUNTER — NON-APPOINTMENT (OUTPATIENT)
Age: 33
End: 2023-09-17

## 2023-10-17 ENCOUNTER — NON-APPOINTMENT (OUTPATIENT)
Age: 33
End: 2023-10-17

## 2023-12-13 ENCOUNTER — NON-APPOINTMENT (OUTPATIENT)
Age: 33
End: 2023-12-13

## 2023-12-13 ENCOUNTER — APPOINTMENT (OUTPATIENT)
Dept: CARDIOLOGY | Facility: CLINIC | Age: 33
End: 2023-12-13
Payer: MEDICAID

## 2023-12-13 VITALS
OXYGEN SATURATION: 99 % | HEIGHT: 63 IN | DIASTOLIC BLOOD PRESSURE: 68 MMHG | BODY MASS INDEX: 26.93 KG/M2 | WEIGHT: 152 LBS | SYSTOLIC BLOOD PRESSURE: 104 MMHG | HEART RATE: 79 BPM

## 2023-12-13 DIAGNOSIS — E78.5 HYPERLIPIDEMIA, UNSPECIFIED: ICD-10-CM

## 2023-12-13 DIAGNOSIS — R42 DIZZINESS AND GIDDINESS: ICD-10-CM

## 2023-12-13 PROCEDURE — 93000 ELECTROCARDIOGRAM COMPLETE: CPT

## 2023-12-13 PROCEDURE — 99214 OFFICE O/P EST MOD 30 MIN: CPT | Mod: 25

## 2023-12-13 NOTE — CARDIOLOGY SUMMARY
[de-identified] : November 30, 2022 normal sinus EKG in the emergency room December 31, 2022 normal sinus rhythm left bundle branch block 12/13/24 [de-identified] : Event monitor.  Showed intermittent left bundle branch block\par  Event monitor.  7 days.  July 2022.  Range .  In normal sinus rhythm.  PACs.  PVCs. [de-identified] : Stress Echocardiogram December 22, 2022.  Nonischemic in presence of left bundle branch [de-identified] : cardiac MRI in January 2023 which was normal with and without contrast.  [de-identified] : December 2022.  LVEF 55 to 60% normal LV dimensions [Normal] : normal [___] : [unfilled] [LVEF ___%] : LVEF [unfilled]%

## 2023-12-13 NOTE — REVIEW OF SYSTEMS
[SOB] : no shortness of breath [Dyspnea on exertion] : dyspnea during exertion [Chest Discomfort] : chest discomfort [Palpitations] : palpitations [Negative] : Heme/Lymph [FreeTextEntry2] : as per hpi  [FreeTextEntry5] : As per HPI [de-identified] : As noted above

## 2023-12-13 NOTE — PHYSICAL EXAM
[Well Developed] : well developed [Well Nourished] : well nourished [No Acute Distress] : no acute distress [Normal Venous Pressure] : normal venous pressure [No Carotid Bruit] : no carotid bruit [Normal S1, S2] : normal S1, S2 [No Murmur] : no murmur [No Gallop] : no gallop [No Rub] : no rub [Clear Lung Fields] : clear lung fields [Good Air Entry] : good air entry [No Respiratory Distress] : no respiratory distress  [Normal Gait] : normal gait [No Edema] : no edema [No Cyanosis] : no cyanosis [No Clubbing] : no clubbing [Normal Radial B/L] : normal radial B/L [Moves all extremities] : moves all extremities [Normal Speech] : normal speech [Alert and Oriented] : alert and oriented [de-identified] : No postural shifts in blood pressure

## 2023-12-13 NOTE — ASSESSMENT
[FreeTextEntry1] : Recent labs reviewed.  Elevated lipid panel discussed.  Lifetime risk reviewed.  Normal CBC CMP TSH hemoglobin A1c

## 2023-12-13 NOTE — REASON FOR VISIT
[Symptom and Test Evaluation] : symptom and test evaluation [FreeTextEntry1] :  33-year-old female comes in with multitude of symptoms which includes intermittent palpitations, postural dizziness, abdominal and gastrointestinal symptoms, ophthalmic symptoms.  She has seen multiple specialist.  No clear etiology has been found.   She has again significant intermittent symptoms.   She has again seen ENT and primary Extensive labs were done.  Some physical maneuver by ENT physician did improve for short period of time. She subsequently had  cat bite related angio edema and anaphyl requiring  epinephrine in the emergency room. Her symptoms have continued slightly better than before. she denies any history of smoking.  No significant alcohol intake.

## 2023-12-13 NOTE — DISCUSSION/SUMMARY
[FreeTextEntry1] : 33-year-old female with above medical history active medical problems as noted below 1.  Intermittent palpitations now associated near syncope.  Shortness of breath and chest discomfort.  With exertion.  Intermittent left bundle branch block.  Nonischemic stress test.  Preserved EF.  Cardiac MRI normal.  Possible POTS. Recommendation includes   Event monitor again.  EP evaluation.  She had echo cardiac MRI and stress test without any significant normality in the past. Continue to avoid caffeine and alcohol intake Continue hydration Increasing regular exercise Stress management She does not want to try propranolol though we can try that.  SSRI also can be tried. #2 elevated LDL cholesterol.  Dietary restriction and follow-up.  .  Counseling regarding low saturated fat, salt and carbohydrate intake was reviewed. Active lifestyle and regular. Exercise along with weight management is advised. All the above were at length reviewed. Answered all the questions. Thank you very much for this kind referral. Please do not hesitate to give me a call for any question. Part of this transcription was done with voice recognition software and phonetically similar errors are common. I apologize for that. Please do not hesitate to call for any questions due to above.   Sincerely, Latosha Friend MD,FACC,MARQUIS

## 2023-12-25 ENCOUNTER — NON-APPOINTMENT (OUTPATIENT)
Age: 33
End: 2023-12-25

## 2024-01-03 ENCOUNTER — OFFICE (OUTPATIENT)
Dept: URBAN - METROPOLITAN AREA CLINIC 8 | Facility: CLINIC | Age: 34
Setting detail: OPHTHALMOLOGY
End: 2024-01-03
Payer: MEDICAID

## 2024-01-03 DIAGNOSIS — H52.13: ICD-10-CM

## 2024-01-03 DIAGNOSIS — H43.393: ICD-10-CM

## 2024-01-03 DIAGNOSIS — G43.109: ICD-10-CM

## 2024-01-03 PROBLEM — H52.7 REFRACTIVE ERROR: Status: ACTIVE | Noted: 2024-01-03

## 2024-01-03 PROCEDURE — 92014 COMPRE OPH EXAM EST PT 1/>: CPT | Performed by: OPHTHALMOLOGY

## 2024-01-03 PROCEDURE — 92015 DETERMINE REFRACTIVE STATE: CPT | Performed by: OPHTHALMOLOGY

## 2024-01-03 ASSESSMENT — REFRACTION_MANIFEST
OD_SPHERE: -4.00
OS_AXIS: 155
OS_CYLINDER: -2.00
OS_VA1: 20/20
OD_VA1: 20/20
OU_VA: 20/20
OS_SPHERE: -3.75
OS_SPHERE: -4.00
OD_CYLINDER: -2.00
OD_CYLINDER: -2.00
OD_AXIS: 10
OU_VA: 20/20
OD_SPHERE: -4.25
OS_VA1: 20/20
OS_AXIS: 155
OD_VA1: 20/20
OD_AXIS: 10
OS_CYLINDER: -2.00

## 2024-01-03 ASSESSMENT — REFRACTION_AUTOREFRACTION
OD_SPHERE: -4.50
OS_AXIS: 161
OD_AXIS: 006
OD_CYLINDER: -2.75
OS_CYLINDER: -3.25
OS_SPHERE: -4.50

## 2024-01-03 ASSESSMENT — REFRACTION_CURRENTRX
OS_VPRISM_DIRECTION: SV
OS_OVR_VA: 20/
OD_VPRISM_DIRECTION: SV
OD_AXIS: 011
OS_SPHERE: -3.75
OS_AXIS: 154
OD_CYLINDER: -2.00
OD_SPHERE: -4.00
OS_CYLINDER: -2.00
OD_OVR_VA: 20/

## 2024-01-03 ASSESSMENT — SPHEQUIV_DERIVED
OD_SPHEQUIV: -5
OS_SPHEQUIV: -5
OS_SPHEQUIV: -6.125
OS_SPHEQUIV: -4.75
OD_SPHEQUIV: -5.25
OD_SPHEQUIV: -5.875

## 2024-01-03 ASSESSMENT — CONFRONTATIONAL VISUAL FIELD TEST (CVF)
OD_FINDINGS: FULL
OS_FINDINGS: FULL

## 2024-01-19 ENCOUNTER — APPOINTMENT (OUTPATIENT)
Dept: ELECTROPHYSIOLOGY | Facility: CLINIC | Age: 34
End: 2024-01-19
Payer: MEDICAID

## 2024-01-19 VITALS
BODY MASS INDEX: 26.4 KG/M2 | HEART RATE: 69 BPM | OXYGEN SATURATION: 99 % | HEIGHT: 63 IN | WEIGHT: 149 LBS | SYSTOLIC BLOOD PRESSURE: 108 MMHG | DIASTOLIC BLOOD PRESSURE: 60 MMHG

## 2024-01-19 DIAGNOSIS — R42 DIZZINESS AND GIDDINESS: ICD-10-CM

## 2024-01-19 PROCEDURE — 99204 OFFICE O/P NEW MOD 45 MIN: CPT | Mod: 25

## 2024-01-19 PROCEDURE — 93000 ELECTROCARDIOGRAM COMPLETE: CPT

## 2024-01-24 PROBLEM — R42 DIZZINESS: Status: ACTIVE | Noted: 2023-12-13

## 2024-04-24 ENCOUNTER — APPOINTMENT (OUTPATIENT)
Dept: ELECTROPHYSIOLOGY | Facility: CLINIC | Age: 34
End: 2024-04-24
Payer: MEDICAID

## 2024-04-24 ENCOUNTER — NON-APPOINTMENT (OUTPATIENT)
Age: 34
End: 2024-04-24

## 2024-04-24 VITALS — SYSTOLIC BLOOD PRESSURE: 108 MMHG | HEIGHT: 63 IN | DIASTOLIC BLOOD PRESSURE: 60 MMHG

## 2024-04-24 PROCEDURE — 99213 OFFICE O/P EST LOW 20 MIN: CPT

## 2024-04-28 NOTE — HISTORY OF PRESENT ILLNESS
[FreeTextEntry1] : 32 yo woman seen for palpitations Patient has had a  number  of symptoms including palpitations, dizziness, GI issues, chronic headaches, complex migraines including numbness and tingling. Migraines since her 20s Pregnancies 2018, 2020. Symptoms started with first pregnancy.  After her second pregnancy her symptoms got worse at that time she also had COVID. Had COVID 2020. She is currently on folic acid, magnesium.  Propranolol was previously prescribed but she did not take because she was afraid with her blood pressure being low will be problematic. Recovered AA Takes melatonin

## 2024-04-28 NOTE — DISCUSSION/SUMMARY
[FreeTextEntry1] : Since she has changed her lifestyle including decreasing stress and changes in her diet including reduction of carbohydrates , her palpitations has resolved.  Recommendation:  the patient is to continue  with the lifestyle modification as well as stress reduction in addition.  f/u eval at around 24 weeks  If recurrent palps she will notify us and f/u monitoring.  She is not taking propranolol.

## 2024-04-28 NOTE — HISTORY OF PRESENT ILLNESS
[FreeTextEntry1] : 32 yo woman f/u [facility] - 6 weeks pregnant prior  palpitations - now resolved Patient previous  had  symptoms including palpitations, dizziness, GI issues, chronic headaches, complex migraines including numbness and tingling . Palpitations and GI issues have resolved - felt to be related to changes in diet - less carbs.  Migraines since her 20s Pregnancies 2018, 2020. Symptoms started with first pregnancy.  After her second pregnancy her symptoms got worse at that time she also had COVID. Had COVID 2020. .  Propranolol was previously prescribed but she did not take because she was afraid with her blood pressure being low will be problematic. Recovered AA Takes melatonin

## 2024-04-28 NOTE — DISCUSSION/SUMMARY
[EKG obtained to assist in diagnosis and management of assessed problem(s)] : EKG obtained to assist in diagnosis and management of assessed problem(s) [FreeTextEntry1] : Her symptoms may be volume/ vagal related EKG without prexcitation  Echo nl function.  She also had periods of confusion back in December when she was very sick. Since she has changed her lifestyle including decreasing stress and changes in her diet including reduction of carbohydrates she has symptomatically gotten significantly better.   Recommendation:  the patient is to continue  with the lifestyle modification as well as stress reduction in addition I would recommend that she take electrolyte type fluids by the somewhat higher and salt sodium chloride.  And if her blood pressure continues to play a role  on the low side she may benefit from midodrine to support her blood pressure

## 2024-04-28 NOTE — HISTORY OF PRESENT ILLNESS
[FreeTextEntry1] : 34 yo woman seen for palpitations Patient has had a  number  of symptoms including palpitations, dizziness, GI issues, chronic headaches, complex migraines including numbness and tingling. Migraines since her 20s Pregnancies 2018, 2020. Symptoms started with first pregnancy.  After her second pregnancy her symptoms got worse at that time she also had COVID. Had COVID 2020. She is currently on folic acid, magnesium.  Propranolol was previously prescribed but she did not take because she was afraid with her blood pressure being low will be problematic. Recovered AA Takes melatonin

## 2024-04-28 NOTE — REVIEW OF SYSTEMS
[Weight Gain (___ Lbs)] : no recent weight gain [Feeling Fatigued] : not feeling fatigued [Blurry Vision] : no blurred vision [Sore Throat] : no sore throat [SOB] : no shortness of breath [Chest Discomfort] : no chest discomfort [Palpitations] : no palpitations [Cough] : no cough [Abdominal Pain] : no abdominal pain [Rash] : no rash [Dizziness] : no dizziness [Easy Bleeding] : no tendency for easy bleeding

## 2024-05-27 NOTE — DISCUSSION/SUMMARY
[EKG obtained to assist in diagnosis and management of assessed problem(s)] : EKG obtained to assist in diagnosis and management of assessed problem(s) [FreeTextEntry1] : Her symptoms may be volume/ vagal related EKG without prexcitation  Echo nl function.  She also had periods of confusion back in December when she was very sick. Since she has changed her lifestyle including decreasing stress and changes in her diet including reduction of carbohydrates she has symptomatically gotten significantly better.   Recommendation:  the patient is to continue  with the lifestyle modification as well as stress reduction in addition I would recommend that she take electrolyte type fluids by the somewhat higher and salt sodium chloride.  And if her blood pressure continues to play a role  on the low side she may benefit from midodrine to support her blood pressure  Time-based billing (NON-critical care) Time-based billing (NON-critical care) Time-based billing (NON-critical care) Time-based billing (NON-critical care) Time-based billing (NON-critical care) Time-based billing (NON-critical care) Time-based billing (NON-critical care) Time-based billing (NON-critical care)

## 2024-05-28 NOTE — DISCUSSION/SUMMARY
This is indication for the patient's 5/24 urgent visit, symptoms started on Friday, she gave a sample, culture has since grown out typical E. coli, it is pansensitive, she is allergic to Bactrim, so we will go with Macrodantin since her renal function is excellent.  Patient to call if symptoms not improving as expected, will give her some fluconazole for frequent yeast infections as well.   [FreeTextEntry1] : 33-year-old female with above medical history active medical problems as noted below\par 1.  Intermittent palpitations now associated near syncope.  Shortness of breath and chest discomfort.  With exertion.  Intermittent left bundle branch block.  Nonischemic stress test.  Preserved EF.  Cardiac MRI normal.  Possible POTS.\par Recommendation includes\par Continue to avoid caffeine and alcohol intake\par Continue hydration\par Increasing regular exercise\par Stress management\par Discussed role of SSRI.  And beta-blockers.  She does not want to take SSRI as she has had significant problems with amitriptyline in the past.  Will try propranolol at present prior to use of sinus node modifying agent I have\par #2 elevated LDL cholesterol.  Dietary restriction and follow-up.  No indication for statin therapy at present.\par \par Counseling regarding low saturated fat, salt and carbohydrate intake was reviewed. Active lifestyle and regular. Exercise along with weight management is advised.\par All the above were at length reviewed. Answered all the questions. Thank you very much for this kind referral. Please do not hesitate to give me a call for any question.\par Part of this transcription was done with voice recognition software and phonetically similar errors are common. I apologize for that. Please do not hesitate to call for any questions due to above.\par \par \par Sincerely,\par Latosha Friend MD,FACC,MARQUIS\par \par

## 2024-05-31 ENCOUNTER — NON-APPOINTMENT (OUTPATIENT)
Age: 34
End: 2024-05-31

## 2024-06-24 ENCOUNTER — NON-APPOINTMENT (OUTPATIENT)
Age: 34
End: 2024-06-24

## 2024-06-24 ENCOUNTER — APPOINTMENT (OUTPATIENT)
Dept: CARDIOLOGY | Facility: CLINIC | Age: 34
End: 2024-06-24
Payer: MEDICAID

## 2024-06-24 VITALS
HEART RATE: 96 BPM | WEIGHT: 150 LBS | DIASTOLIC BLOOD PRESSURE: 50 MMHG | HEIGHT: 63 IN | BODY MASS INDEX: 26.58 KG/M2 | OXYGEN SATURATION: 98 % | SYSTOLIC BLOOD PRESSURE: 100 MMHG

## 2024-06-24 VITALS — SYSTOLIC BLOOD PRESSURE: 110 MMHG | HEART RATE: 110 BPM | DIASTOLIC BLOOD PRESSURE: 70 MMHG

## 2024-06-24 DIAGNOSIS — I44.7 LEFT BUNDLE-BRANCH BLOCK, UNSPECIFIED: ICD-10-CM

## 2024-06-24 DIAGNOSIS — R00.0 TACHYCARDIA, UNSPECIFIED: ICD-10-CM

## 2024-06-24 DIAGNOSIS — G90.A POSTURAL ORTHOSTATIC TACHYCARDIA SYNDROME [POTS]: ICD-10-CM

## 2024-06-24 DIAGNOSIS — R00.2 PALPITATIONS: ICD-10-CM

## 2024-06-24 PROCEDURE — 93306 TTE W/DOPPLER COMPLETE: CPT

## 2024-06-24 PROCEDURE — G2211 COMPLEX E/M VISIT ADD ON: CPT | Mod: NC,1L

## 2024-06-24 PROCEDURE — 93000 ELECTROCARDIOGRAM COMPLETE: CPT

## 2024-06-24 PROCEDURE — 93228 REMOTE 30 DAY ECG REV/REPORT: CPT | Mod: 1L

## 2024-06-24 PROCEDURE — 99214 OFFICE O/P EST MOD 30 MIN: CPT | Mod: 25

## 2024-06-24 RX ORDER — PROPRANOLOL HYDROCHLORIDE 10 MG/1
10 TABLET ORAL DAILY
Qty: 90 | Refills: 1 | Status: DISCONTINUED | COMMUNITY
Start: 2023-07-25 | End: 2024-06-24

## 2024-07-09 ENCOUNTER — APPOINTMENT (OUTPATIENT)
Dept: CARDIOLOGY | Facility: CLINIC | Age: 34
End: 2024-07-09
Payer: MEDICAID

## 2024-07-09 DIAGNOSIS — I44.7 LEFT BUNDLE-BRANCH BLOCK, UNSPECIFIED: ICD-10-CM

## 2024-07-09 DIAGNOSIS — R07.89 OTHER CHEST PAIN: ICD-10-CM

## 2024-07-09 DIAGNOSIS — R00.0 TACHYCARDIA, UNSPECIFIED: ICD-10-CM

## 2024-07-09 DIAGNOSIS — G90.A POSTURAL ORTHOSTATIC TACHYCARDIA SYNDROME [POTS]: ICD-10-CM

## 2024-07-09 DIAGNOSIS — R00.2 PALPITATIONS: ICD-10-CM

## 2024-07-09 PROCEDURE — G2211 COMPLEX E/M VISIT ADD ON: CPT | Mod: NC,1L

## 2024-07-09 PROCEDURE — 99214 OFFICE O/P EST MOD 30 MIN: CPT

## 2024-08-18 ENCOUNTER — NON-APPOINTMENT (OUTPATIENT)
Age: 34
End: 2024-08-18

## 2024-08-28 ENCOUNTER — APPOINTMENT (OUTPATIENT)
Dept: ELECTROPHYSIOLOGY | Facility: CLINIC | Age: 34
End: 2024-08-28

## 2024-09-25 ENCOUNTER — APPOINTMENT (OUTPATIENT)
Dept: ELECTROPHYSIOLOGY | Facility: CLINIC | Age: 34
End: 2024-09-25
Payer: MEDICAID

## 2024-09-25 VITALS
SYSTOLIC BLOOD PRESSURE: 122 MMHG | WEIGHT: 160 LBS | HEIGHT: 63 IN | OXYGEN SATURATION: 99 % | BODY MASS INDEX: 28.35 KG/M2 | HEART RATE: 106 BPM | DIASTOLIC BLOOD PRESSURE: 50 MMHG

## 2024-09-25 DIAGNOSIS — G90.A POSTURAL ORTHOSTATIC TACHYCARDIA SYNDROME [POTS]: ICD-10-CM

## 2024-09-25 DIAGNOSIS — R00.0 TACHYCARDIA, UNSPECIFIED: ICD-10-CM

## 2024-09-25 PROCEDURE — 93242 EXT ECG>48HR<7D RECORDING: CPT

## 2024-09-25 PROCEDURE — 99214 OFFICE O/P EST MOD 30 MIN: CPT | Mod: 25

## 2024-09-25 PROCEDURE — 93000 ELECTROCARDIOGRAM COMPLETE: CPT | Mod: 59

## 2024-09-25 RX ORDER — PNV 24/IRON AA CHEL/FOLIC ACID 30 MG-975
TABLET ORAL
Refills: 0 | Status: ACTIVE | COMMUNITY

## 2024-09-25 NOTE — HISTORY OF PRESENT ILLNESS
[FreeTextEntry1] : The patient is a 34-year-old woman who is seen in follow-up evaluation she is now 29 weeks of gestation.  She has been doing well until recently when she has had increasing palpitations as well as dizziness.  She has noted to have heart rates in the range of 125 bpm.  Also she had COVID infection early August but is now resolved.  Concern also that she has an occasional low blood pressure.   Patient previous  had  symptoms including palpitations, dizziness, GI issues, chronic headaches, complex migraines including numbness and tingling . Palpitations and GI issues have resolved - felt to be related to changes in diet - less carbs.  Migraines since her 20s Pregnancies 2018, 2020. Symptoms started with first pregnancy.  After her second pregnancy her symptoms got worse at that time she also had COVID. Had COVID 2020. .  Propranolol was previously prescribed but she did not take because she was afraid with her blood pressure being low will be problematic. Recovered AA Takes melatonin

## 2024-09-25 NOTE — DISCUSSION/SUMMARY
[EKG obtained to assist in diagnosis and management of assessed problem(s)] : EKG obtained to assist in diagnosis and management of assessed problem(s) [FreeTextEntry1] : The patient is now 29 weeks pregnant.  She is having symptoms of palpitations.  These are previous felt to be related to her postural orthostatic hypotension and possibly sinus tachycardia.  She also has a baseline left bundle branch block.  Concern also with her low blood pressure.  She is currently on prenatal vitamins.  First regarding her palpitations we will issue her a monitor to see if we can diagnosis better whether these are individual sinus tachycardia episodes or response to her low blood pressure and the postural tachycardia orthostatic syndrome.  Secondly we would encourage increased fluid intake.  At this point I would not recommend a beta-blocker she had previously taken propranolol.  Long-term concern with her left bundle branch block.  Will also get follow-up echocardiography to ensure that the left ventricular function has remained normal.  Patient will also inquire from her OB doctor whether she can take magnesium this may be beneficial from a cardiac standpoint but will be cleared by the OB/GYN doctor first.

## 2024-10-17 ENCOUNTER — NON-APPOINTMENT (OUTPATIENT)
Age: 34
End: 2024-10-17

## 2024-11-29 PROCEDURE — 93244 EXT ECG>48HR<7D REV&INTERPJ: CPT

## 2024-12-21 ENCOUNTER — NON-APPOINTMENT (OUTPATIENT)
Age: 34
End: 2024-12-21

## 2025-02-12 ENCOUNTER — APPOINTMENT (OUTPATIENT)
Dept: CARDIOLOGY | Facility: CLINIC | Age: 35
End: 2025-02-12

## 2025-02-26 ENCOUNTER — APPOINTMENT (OUTPATIENT)
Dept: CARDIOLOGY | Facility: CLINIC | Age: 35
End: 2025-02-26
Payer: MEDICAID

## 2025-02-26 ENCOUNTER — NON-APPOINTMENT (OUTPATIENT)
Age: 35
End: 2025-02-26

## 2025-02-26 VITALS
BODY MASS INDEX: 27.46 KG/M2 | HEIGHT: 63 IN | OXYGEN SATURATION: 97 % | HEART RATE: 92 BPM | SYSTOLIC BLOOD PRESSURE: 112 MMHG | WEIGHT: 155 LBS | DIASTOLIC BLOOD PRESSURE: 74 MMHG

## 2025-02-26 DIAGNOSIS — I44.7 LEFT BUNDLE-BRANCH BLOCK, UNSPECIFIED: ICD-10-CM

## 2025-02-26 DIAGNOSIS — R07.9 CHEST PAIN, UNSPECIFIED: ICD-10-CM

## 2025-02-26 DIAGNOSIS — E78.5 HYPERLIPIDEMIA, UNSPECIFIED: ICD-10-CM

## 2025-02-26 DIAGNOSIS — R00.0 TACHYCARDIA, UNSPECIFIED: ICD-10-CM

## 2025-02-26 PROCEDURE — 99215 OFFICE O/P EST HI 40 MIN: CPT

## 2025-02-26 PROCEDURE — 93242 EXT ECG>48HR<7D RECORDING: CPT

## 2025-03-14 PROCEDURE — 93244 EXT ECG>48HR<7D REV&INTERPJ: CPT

## 2025-03-21 ENCOUNTER — APPOINTMENT (OUTPATIENT)
Dept: CARDIOLOGY | Facility: CLINIC | Age: 35
End: 2025-03-21

## 2025-03-27 ENCOUNTER — APPOINTMENT (OUTPATIENT)
Dept: CARDIOLOGY | Facility: CLINIC | Age: 35
End: 2025-03-27
Payer: MEDICAID

## 2025-03-27 VITALS
OXYGEN SATURATION: 97 % | HEIGHT: 63 IN | DIASTOLIC BLOOD PRESSURE: 60 MMHG | WEIGHT: 155 LBS | SYSTOLIC BLOOD PRESSURE: 96 MMHG | HEART RATE: 107 BPM | BODY MASS INDEX: 27.46 KG/M2

## 2025-03-27 VITALS
SYSTOLIC BLOOD PRESSURE: 96 MMHG | WEIGHT: 155 LBS | HEART RATE: 104 BPM | OXYGEN SATURATION: 97 % | DIASTOLIC BLOOD PRESSURE: 60 MMHG | HEIGHT: 63 IN | BODY MASS INDEX: 27.46 KG/M2

## 2025-03-27 DIAGNOSIS — G90.A POSTURAL ORTHOSTATIC TACHYCARDIA SYNDROME [POTS]: ICD-10-CM

## 2025-03-27 DIAGNOSIS — R07.9 CHEST PAIN, UNSPECIFIED: ICD-10-CM

## 2025-03-27 DIAGNOSIS — R06.02 SHORTNESS OF BREATH: ICD-10-CM

## 2025-03-27 DIAGNOSIS — I44.7 LEFT BUNDLE-BRANCH BLOCK, UNSPECIFIED: ICD-10-CM

## 2025-03-27 DIAGNOSIS — E78.5 HYPERLIPIDEMIA, UNSPECIFIED: ICD-10-CM

## 2025-03-27 DIAGNOSIS — R00.0 TACHYCARDIA, UNSPECIFIED: ICD-10-CM

## 2025-03-27 PROCEDURE — 93351 STRESS TTE COMPLETE: CPT

## 2025-03-27 PROCEDURE — 99214 OFFICE O/P EST MOD 30 MIN: CPT

## 2025-03-27 PROCEDURE — 93320 DOPPLER ECHO COMPLETE: CPT

## 2025-05-05 ENCOUNTER — NON-APPOINTMENT (OUTPATIENT)
Age: 35
End: 2025-05-05

## 2025-09-02 ENCOUNTER — NON-APPOINTMENT (OUTPATIENT)
Age: 35
End: 2025-09-02